# Patient Record
Sex: FEMALE
[De-identification: names, ages, dates, MRNs, and addresses within clinical notes are randomized per-mention and may not be internally consistent; named-entity substitution may affect disease eponyms.]

---

## 2019-06-23 ENCOUNTER — HOSPITAL ENCOUNTER (EMERGENCY)
Dept: HOSPITAL 25 - UCEAST | Age: 49
Discharge: HOME | End: 2019-06-23
Payer: COMMERCIAL

## 2019-06-23 VITALS — SYSTOLIC BLOOD PRESSURE: 103 MMHG | DIASTOLIC BLOOD PRESSURE: 65 MMHG

## 2019-06-23 DIAGNOSIS — L03.011: Primary | ICD-10-CM

## 2019-06-23 PROCEDURE — 90471 IMMUNIZATION ADMIN: CPT

## 2019-06-23 PROCEDURE — 10060 I&D ABSCESS SIMPLE/SINGLE: CPT

## 2019-06-23 PROCEDURE — G0463 HOSPITAL OUTPT CLINIC VISIT: HCPCS

## 2019-06-23 PROCEDURE — 90715 TDAP VACCINE 7 YRS/> IM: CPT

## 2019-06-23 PROCEDURE — 99212 OFFICE O/P EST SF 10 MIN: CPT

## 2019-06-23 NOTE — UC
Skin Complaint HPI





- HPI Summary


HPI Summary: 





SUSTAINED A PAPER CUT TO HER RIGHT MIDDLE FINGER ABOUT ONE WEEK AGO.  SINCE 

THEN THE FINGER HAS BECOME RED, WARM AND SWOLLEN.  NO DRAINAGE.  NO FEVER.  

UNKNOWN DATE OF LAST TETANUS.





- History of Current Complaint


Chief Complaint: UCUpperExtremity


Time Seen by Provider: 06/23/19 20:16


Stated Complaint: FINGER PAIN


Hx Obtained From: Patient


Onset/Duration: Gradual Onset, Lasting Days, Still Present


Timing: Constant


Onset Severity: Mild


Current Severity: Moderate


Pain Intensity: 5


Pain Scale Used: 0-10 Numeric


Character: Swelling, Pain, Redness


Aggravating Factor(s): Touch - MVMT


Alleviating Factor(s): Nothing


Associated Signs & Symptoms: Positive: Tenderness





- Allergy/Home Medications


Allergies/Adverse Reactions: 


 Allergies











Allergy/AdvReac Type Severity Reaction Status Date / Time


 


No Known Allergies Allergy   Verified 06/23/19 19:48











Home Medications: 


 Home Medications





Menoclear  1 cap PO BID 06/23/19 [History]


Multivitamin [Multivitamins] 1 cap PO DAILY 06/23/19 [History Confirmed 06/23/19

]


Naproxen Sodium [Aleve] 220 mg PO Q12HR PRN 06/23/19 [History Confirmed 06/23/19

]











PMH/Surg Hx/FS Hx/Imm Hx


Previously Healthy: Yes





- Surgical History


Surgical History: Yes


Surgery Procedure, Year, and Place: left thyroidectomy.  cervical vertebrae 

fracture





- Family History


Known Family History: Positive: Non-Contributory





- Social History


Alcohol Use: Occasionally


Substance Use Type: None


Smoking Status (MU): Never Smoked Tobacco





Review of Systems


All Other Systems Reviewed And Are Negative: Yes


Constitutional: Positive: Negative


Skin: Positive: Other - ERYTHEMA


Respiratory: Positive: Negative


Cardiovascular: Positive: Negative


Gastrointestinal: Positive: Negative





Physical Exam


Triage Information Reviewed: Yes


Appearance: Well-Appearing, No Pain Distress, Well-Nourished


Vital Signs: 


 Initial Vital Signs











Temp  98.8 F   06/23/19 19:50


 


Pulse  70   06/23/19 19:50


 


Resp  18   06/23/19 19:50


 


BP  103/65   06/23/19 19:50


 


Pulse Ox  100   06/23/19 19:50











Vital Signs Reviewed: Yes


Eyes: Positive: Conjunctiva Clear


ENT: Positive: Hearing grossly normal


Neck: Positive: Supple


Respiratory: Positive: No respiratory distress, No accessory muscle use


Cardiovascular: Positive: Pulses Normal


Abdomen Description: Positive: Soft


Musculoskeletal: Positive: ROM Limited @ - RIGHT 3RD FINGER, Edema @ - RIGHT 

3RD FINGER


Neurological: Positive: Alert


Psychological: Positive: Age Appropriate Behavior


Skin: Positive: Other - RIGHT 3RD FINGER ERYTHEMA WITH THICKENED SKIN OVER 

PAPER CUT SITE





Course/Dx





- Course


Course Of Treatment: 





POSSIBLE POCKET OF PUS UNDERNEATH THICKENED SKIN AT THE CUT SITE.  TIMEOUT 

COMPLETE.  11 BLADE USED TO INCISE OVER THE AREA IN QUESTION.  SOME CALLOUSED 

SKIN WAS DEBRIDED.  NO PUS.  FINGER DRESSED. KEFLEX FOR INFECTION. TDAP BOOSTED.





- Diagnoses


Provider Diagnosis: 


 Cellulitis of right middle finger








Discharge





- Sign-Out/Discharge


Documenting (check all that apply): Patient Departure


All imaging exams completed and their final reports reviewed: No Studies





- Discharge Plan


Condition: Stable


Disposition: HOME


Prescriptions: 


Cephalexin CAP* [Keflex 500 CAP*] 1,000 mg PO BID #24 cap


Patient Education Materials:  Cellulitis (ED)


Referrals: 


Sveta Meadows MD [Primary Care Provider] - If Needed


Additional Instructions: 


TAKE THE ANTIBIOTIC FOR THE FULL COURSE TO COMBAT THE SKIN INFECTION ON YOUR 

FINGER.


WARM/HOT COMPRESSES/SOAKS AT LEAST 4 TIMES DAILY. APPLY THIN LAYER OF 

ANTIBIOTIC OINTMENT AND  BANDAGE.


SEEK FOLLOW-UP IF YOU DEVELOP CONTINUED SPREADING REDNESS OF THE SKIN, PURULENT 

DRAINAGE, FEVER, INCREASED PAIN OR ANY OTHER CONCERNING SYMPTOMS.








TETANUS IMMUNIZATION GIVEN (TDAP):


     You have been given an immunization against tetanus.  Please record this 

in your records.  In general, a booster is needed only once every 10 years.  

The tetanus shot protects against tetanus or "lockjaw," which is a complication 

of certain wound infections (the tetanus shot cannot protect against the actual 

infection).


     The immunization site may become warm and red due to local reaction.  If 

this occurs, apply warm compresses and take aspirin or ibuprofen to reduce 

inflammation and discomfort.  Return for evaluation if the reaction becomes 

severe.





- Billing Disposition and Condition


Condition: STABLE


Disposition: Home